# Patient Record
Sex: FEMALE | Race: WHITE | NOT HISPANIC OR LATINO | URBAN - METROPOLITAN AREA
[De-identification: names, ages, dates, MRNs, and addresses within clinical notes are randomized per-mention and may not be internally consistent; named-entity substitution may affect disease eponyms.]

---

## 2018-09-28 ENCOUNTER — EMERGENCY (EMERGENCY)
Facility: HOSPITAL | Age: 83
LOS: 1 days | Discharge: HOME | End: 2018-09-28
Attending: EMERGENCY MEDICINE
Payer: MEDICARE

## 2018-09-28 VITALS
HEART RATE: 80 BPM | TEMPERATURE: 98 F | OXYGEN SATURATION: 97 % | SYSTOLIC BLOOD PRESSURE: 142 MMHG | DIASTOLIC BLOOD PRESSURE: 72 MMHG | RESPIRATION RATE: 16 BRPM

## 2018-09-28 VITALS
RESPIRATION RATE: 20 BRPM | TEMPERATURE: 98 F | HEIGHT: 60 IN | OXYGEN SATURATION: 98 % | WEIGHT: 108.91 LBS | SYSTOLIC BLOOD PRESSURE: 127 MMHG | DIASTOLIC BLOOD PRESSURE: 77 MMHG | HEART RATE: 97 BPM

## 2018-09-28 PROCEDURE — 99284 EMERGENCY DEPT VISIT MOD MDM: CPT | Mod: 25,GC

## 2018-09-28 PROCEDURE — 99284 EMERGENCY DEPT VISIT MOD MDM: CPT | Mod: 25

## 2018-09-28 PROCEDURE — 72170 X-RAY EXAM OF PELVIS: CPT

## 2018-09-28 PROCEDURE — 71045 X-RAY EXAM CHEST 1 VIEW: CPT | Mod: 26

## 2018-09-28 PROCEDURE — 12001 RPR S/N/AX/GEN/TRNK 2.5CM/<: CPT | Mod: GC

## 2018-09-28 PROCEDURE — 72170 X-RAY EXAM OF PELVIS: CPT | Mod: 26

## 2018-09-28 PROCEDURE — 70450 CT HEAD/BRAIN W/O DYE: CPT

## 2018-09-28 PROCEDURE — 12001 RPR S/N/AX/GEN/TRNK 2.5CM/<: CPT

## 2018-09-28 PROCEDURE — 72125 CT NECK SPINE W/O DYE: CPT | Mod: 26

## 2018-09-28 PROCEDURE — 71045 X-RAY EXAM CHEST 1 VIEW: CPT

## 2018-09-28 PROCEDURE — 70450 CT HEAD/BRAIN W/O DYE: CPT | Mod: 26

## 2018-09-28 PROCEDURE — 72125 CT NECK SPINE W/O DYE: CPT

## 2018-09-28 RX ORDER — TETANUS TOXOID, REDUCED DIPHTHERIA TOXOID AND ACELLULAR PERTUSSIS VACCINE, ADSORBED 5; 2.5; 8; 8; 2.5 [IU]/.5ML; [IU]/.5ML; UG/.5ML; UG/.5ML; UG/.5ML
0.5 SUSPENSION INTRAMUSCULAR ONCE
Qty: 0 | Refills: 0 | Status: DISCONTINUED | OUTPATIENT
Start: 2018-09-28 | End: 2018-09-28

## 2018-09-28 RX ORDER — ACETAMINOPHEN 500 MG
650 TABLET ORAL ONCE
Qty: 0 | Refills: 0 | Status: DISCONTINUED | OUTPATIENT
Start: 2018-09-28 | End: 2018-09-28

## 2018-09-28 RX ORDER — ACETAMINOPHEN 500 MG
650 TABLET ORAL ONCE
Qty: 0 | Refills: 0 | Status: COMPLETED | OUTPATIENT
Start: 2018-09-28 | End: 2018-09-28

## 2018-09-28 RX ADMIN — Medication 650 MILLIGRAM(S): at 14:29

## 2018-09-28 RX ADMIN — Medication 650 MILLIGRAM(S): at 13:10

## 2018-09-28 NOTE — ED ADULT TRIAGE NOTE - CHIEF COMPLAINT QUOTE
Patient reports falling down 10 stairs today s/p mechanical fall.  Lac on top of head, bleeding controlled.   Patient denies LOC, but has dizziness since fall.  Patient denies AC and denies change in vision, N/V.  Patient able to ambulate since fall and at baseline mental status.

## 2018-09-28 NOTE — ED PROVIDER NOTE - OBJECTIVE STATEMENT
Ryann Maldonado, DO: 84F with hx of PMR, spinal stenosis on Gabapentin, asthma with multiple falls who was carrying multiple things up stairs. Ambulates with cane at baseline. Denies CP, SOB, dizziness, palpitations prior to fall. Endorses HA & had lacerations. Denies nausea/vomiting, vision changes, hearing changes, motor weakness, sensory loss.     PMD: Stephanie Cobos

## 2018-09-28 NOTE — ED PROVIDER NOTE - ATTENDING CONTRIBUTION TO CARE
Attending MD Martin:  I personally have seen and examined this patient.  Resident note reviewed and agree on plan of care and except where noted.  See MDM for details.

## 2018-09-28 NOTE — ED PROVIDER NOTE - MEDICAL DECISION MAKING DETAILS
Attending MD Martin: 85 yo female with PMH for polymyalgia rheumatica and spinal stenosis, vertigo, asthma presents with fall backwards down stairs, 9 steps, with head trauma,  complains of mild right hip pain.  Patient declining any lab work despite the knowing the risk of metabolic cause of weakness and fall.  Agrees to trauma work with xrays and CT of head.

## 2018-09-28 NOTE — ED PROVIDER NOTE - PHYSICAL EXAMINATION
Ryann Maldonado, DO:   Gen: Well appearing, NAD  Head: NCAT  HEENT: PERRL, MMM, normal conjunctiva, anicteric, neck supple  Lung: CTAB, no adventitious sounds  CV: RRR, no murmurs  Abd: soft, NTND, no rebound or guarding, no CVAT  MSK: TTP to R iliac crest, No edema, no visible deformities, full ROM of b/l LE, no pain on axial loading of LE   Neuro: CN II-XII intact. 5/5 strength and normal sensation in all extremities. No diadochokinesia, dysmetria, pronator drift.   Skin: Warm and dry, no evidence of rash, 2 cm laceration to posterior L scalp.   Psych: normal mood and affect

## 2018-09-28 NOTE — ED ADULT NURSE NOTE - OBJECTIVE STATEMENT
84 year old female presents to the ED complaining of a laceration to the back of her head s/p a fall down 10 steps. 'I hit the back of my head'. Pt denies LOC, was ambulatory on scene following fall. On neurological assessment Pt denies LOC, dizziness, or blurry vision. Bleeding controlled. pt declines to let RN place IV or change into a gown. Pt is A&O x 4, VSS, afebrile, ambulating independently. Pt denies fever, chills, NVD, SOB, or chest pain. family at bedside, Pt is not on blood thinners. PEERL, strong use of all extremities.

## 2018-09-28 NOTE — ED PROVIDER NOTE - NSTIMEPROVIDERCAREINITIATE_GEN_ER
“You can access the FollowHealth Patient Portal, offered by [facility], by registering with the following website: [email or web address]”
28-Sep-2018 10:07

## 2018-09-28 NOTE — ED PROVIDER NOTE - PROGRESS NOTE DETAILS
Ryann Maldonado, DO: Ambulatory with stable gait prior to discharge. Educated re: importance of use of cane.

## 2018-09-28 NOTE — ED ADULT NURSE NOTE - NSIMPLEMENTINTERV_GEN_ALL_ED
Implemented All Fall with Harm Risk Interventions:  [place] to call system. Call bell, personal items and telephone within reach. Instruct patient to call for assistance. Room bathroom lighting operational. Non-slip footwear when patient is off stretcher. Physically safe environment: no spills, clutter or unnecessary equipment. Stretcher in lowest position, wheels locked, appropriate side rails in place. Provide visual cue, wrist band, yellow gown, etc. Monitor gait and stability. Monitor for mental status changes and reorient to person, place, and time. Review medications for side effects contributing to fall risk. Reinforce activity limits and safety measures with patient and family. Provide visual clues: red socks.

## 2018-09-28 NOTE — ED PROCEDURE NOTE - ATTENDING CONTRIBUTION TO CARE
Attending MD Martin: Risks, benefit and alternatives of procedure explained to patient and patient demonstrated verbal understanding and consent.  I was present during the key portions of the procedure. 5 staples placed.

## 2018-09-28 NOTE — ED PROVIDER NOTE - CARE PLAN
Principal Discharge DX:	Fall Principal Discharge DX:	Laceration of scalp, initial encounter  Secondary Diagnosis:	Fall, initial encounter

## 2018-09-28 NOTE — ED ADULT NURSE NOTE - CHPI ED NUR SYMPTOMS NEG
no loss of consciousness/no syncope/no seizure/no vomiting/no dizziness/no nausea/no blurred vision/no change in level of consciousness/no weakness/no confusion

## 2020-02-27 NOTE — ED ADULT TRIAGE NOTE - WEIGHT METHOD
[Dear  ___] : Dear  [unfilled], [Thank you very much for allowing me to participate in the care of this patient. If you have any questions, please do not hesitate to contact me] : Thank you very much for allowing me to participate in the care of this patient. If you have any questions, please do not hesitate to contact me. [Attached please find my note.] : Attached please find my note. stated

## 2023-03-20 NOTE — ED ADULT NURSE NOTE - NS ED NURSE RECORD ANOTHER VITAL SIGN
Yes Sarecycline Pregnancy And Lactation Text: This medication is Pregnancy Category D and not consider safe during pregnancy. It is also excreted in breast milk.

## 2025-04-15 NOTE — ED ADULT NURSE NOTE - EENT WDL
Eyes with no visual disturbances.  Ears clean and dry and no hearing difficulties. Nose with pink mucosa and no drainage.  Mouth mucous membranes moist and pink.  No tenderness or swelling to throat or neck.
You can access the FollowMyHealth Patient Portal offered by [facility] by registering at the following website: [email or web address]. By joining [employer]’s FollowMyHealth portal, you will also be able to view your health information using other applications (apps) compatible with our system.